# Patient Record
Sex: FEMALE | Race: WHITE | Employment: UNEMPLOYED | URBAN - METROPOLITAN AREA
[De-identification: names, ages, dates, MRNs, and addresses within clinical notes are randomized per-mention and may not be internally consistent; named-entity substitution may affect disease eponyms.]

---

## 2019-01-15 ENCOUNTER — HOSPITAL ENCOUNTER (OUTPATIENT)
Dept: MAMMOGRAPHY | Age: 59
Discharge: HOME | End: 2019-01-15
Payer: COMMERCIAL

## 2019-01-15 DIAGNOSIS — Z12.31 ENCOUNTER FOR SCREENING MAMMOGRAM FOR BREAST CANCER: ICD-10-CM

## 2019-01-15 PROCEDURE — 77063 BREAST TOMOSYNTHESIS BI: CPT

## 2021-02-27 ENCOUNTER — HOSPITAL ENCOUNTER (OUTPATIENT)
Dept: MAMMOGRAPHY | Age: 61
Discharge: HOME | End: 2021-02-27
Payer: MEDICARE

## 2021-02-27 DIAGNOSIS — Z12.39 SCREENING BREAST EXAMINATION: ICD-10-CM

## 2021-02-27 PROCEDURE — 77067 SCR MAMMO BI INCL CAD: CPT

## 2021-03-01 ENCOUNTER — TELEPHONE (OUTPATIENT)
Dept: MAMMOGRAPHY | Age: 61
End: 2021-03-01

## 2022-04-15 ENCOUNTER — HOSPITAL ENCOUNTER (OUTPATIENT)
Dept: MAMMOGRAPHY | Age: 62
Discharge: HOME | End: 2022-04-15
Payer: MEDICARE

## 2022-04-15 DIAGNOSIS — Z12.31 BREAST CANCER SCREENING BY MAMMOGRAM: ICD-10-CM

## 2022-04-15 PROCEDURE — 77063 BREAST TOMOSYNTHESIS BI: CPT

## 2023-01-05 ENCOUNTER — OFFICE VISIT (OUTPATIENT)
Dept: URGENT CARE | Age: 63
End: 2023-01-05

## 2023-01-05 VITALS
RESPIRATION RATE: 16 BRPM | SYSTOLIC BLOOD PRESSURE: 126 MMHG | HEART RATE: 83 BPM | HEIGHT: 62 IN | OXYGEN SATURATION: 98 % | TEMPERATURE: 98.6 F | DIASTOLIC BLOOD PRESSURE: 92 MMHG | BODY MASS INDEX: 31.47 KG/M2 | WEIGHT: 171 LBS

## 2023-01-05 DIAGNOSIS — R11.11 VOMITING WITHOUT NAUSEA, UNSPECIFIED VOMITING TYPE: ICD-10-CM

## 2023-01-05 DIAGNOSIS — U07.1 COVID-19: Primary | ICD-10-CM

## 2023-01-05 LAB
INFLUENZA VIRUS A RNA: NORMAL
INFLUENZA VIRUS B RNA: NORMAL
KIT LOT NO., HCLOLOT: ABNORMAL
SARS-COV-2, POC: DETECTED
VALID INTERNAL CONTROL, POC: YES
VENDOR AND KIT NAME POC: ABNORMAL

## 2023-01-05 RX ORDER — SOTALOL HYDROCHLORIDE 80 MG/1
120 TABLET ORAL 2 TIMES DAILY
COMMUNITY
Start: 2017-06-22

## 2023-01-05 RX ORDER — HYDROCHLOROTHIAZIDE 12.5 MG/1
12.5 TABLET ORAL DAILY
COMMUNITY
Start: 2021-07-31

## 2023-01-05 RX ORDER — ATORVASTATIN CALCIUM 40 MG/1
40 TABLET, FILM COATED ORAL DAILY
COMMUNITY
Start: 2022-12-08

## 2023-01-05 RX ORDER — LOSARTAN POTASSIUM 100 MG/1
TABLET ORAL
COMMUNITY
Start: 2023-01-05

## 2023-01-05 RX ORDER — HYDROXYZINE HYDROCHLORIDE 25 MG/1
25 TABLET, FILM COATED ORAL
COMMUNITY
Start: 2021-08-20

## 2023-01-05 ASSESSMENT — ENCOUNTER SYMPTOMS
DIARRHEA: 1
NAUSEA: 1
SHORTNESS OF BREATH: 1
COUGH: 1
VOMITING: 1
RHINORRHEA: 1

## 2023-01-05 NOTE — PATIENT INSTRUCTIONS
Please call your cardiologist to discuss Paxlovid therapy for Covid 19  Get plenty of rest, drink lots of fluid   Continue OTC medications as needed  Follow up if symptoms do not improve or worsen  If you develop severe palpitations, shortness of breath or chest pain go to the ER

## 2023-01-05 NOTE — PROGRESS NOTES
Michaela Roman (:  1960) is a 60 y.o. female,New patient, here for evaluation of the following chief complaint(s):  Congestion (Head and chest congestion since tuesday), Emesis (Last night), Diarrhea (Since Tuesday), Headache, Cough, and Generalized Body Aches      ASSESSMENT/PLAN:  1. COVID-19  Results for POC orders placed in visit on 23   POCT Influenza A/B DNA ([facility] i)   Result Value Ref Range    Influenza virus A RNA neg     Influenza virus B RNA neg    Rapid Covid positive  Patient instructed to contact cardiologist to discuss Paxlovid  Currently on Atorvastatin (contraindicated) and Eliquis 5mg BID for a-fib, recommendation to decrease dose with Paxlovid - this provider does not feel comfortable changing medication regimens for Paxlovid use. Patient not currently requiring oxygen and heart rate stable. Get plenty of rest, drink lots of fluid   Continue OTC medications as needed  Follow up if symptoms do not improve or worsen        2. Vomiting without nausea, unspecified vomiting type  - POC COVID-19  - POCT Influenza A/B DNA ([facility]re i)     Return if symptoms worsen or fail to improve. SUBJECTIVE/OBJECTIVE:  Patient comes in today for Congestion, headaches, body aches diarrhea and vomiting for two days. Patient denies any ill contacts. 3 episodes of vomiting in last two days, no diarrhea today patient reports she has been able to tolerate food and fluids in between. Has been taking Tylenol OTC for headaches and body aches. History provided by:  Patient   used: No      Vitals:    23 0839   BP: (!) 126/92   Site: Left Upper Arm   Pulse: 83   Resp: 16   Temp: 98.6 °F (37 °C)   SpO2: 98%   Weight: 171 lb (77.6 kg)   Height: 5' 2\" (1.575 m)       Review of Systems   Constitutional:  Negative for fever. HENT:  Positive for congestion, rhinorrhea and tinnitus. Respiratory:  Positive for cough and shortness of breath.     Cardiovascular:  Positive for palpitations. Negative for chest pain and leg swelling. Gastrointestinal:  Positive for diarrhea, nausea and vomiting. Musculoskeletal:  Positive for myalgias. Neurological:  Positive for headaches. Physical Exam  Vitals reviewed. Constitutional:       Appearance: Normal appearance. HENT:      Head: Normocephalic and atraumatic. Right Ear: There is impacted cerumen. Left Ear: Tympanic membrane is injected. Nose: Congestion and rhinorrhea present. Rhinorrhea is clear. Mouth/Throat:      Mouth: Mucous membranes are moist.      Pharynx: Oropharynx is clear. No pharyngeal swelling, oropharyngeal exudate, posterior oropharyngeal erythema or uvula swelling. Tonsils: No tonsillar exudate or tonsillar abscesses. Eyes:      Conjunctiva/sclera: Conjunctivae normal.   Cardiovascular:      Rate and Rhythm: Normal rate. Rhythm irregular. Pulses: Normal pulses. Heart sounds: No murmur heard. No friction rub. No gallop. Pulmonary:      Effort: Pulmonary effort is normal.      Breath sounds: Normal breath sounds. Musculoskeletal:      Cervical back: Normal range of motion and neck supple. Lymphadenopathy:      Cervical: No cervical adenopathy. Skin:     General: Skin is warm and dry. Neurological:      Mental Status: She is alert and oriented to person, place, and time. An electronic signature was used to authenticate this note.     --Kurt Li, APRN - CNP